# Patient Record
Sex: MALE | Race: OTHER | Employment: FULL TIME | URBAN - METROPOLITAN AREA
[De-identification: names, ages, dates, MRNs, and addresses within clinical notes are randomized per-mention and may not be internally consistent; named-entity substitution may affect disease eponyms.]

---

## 2019-05-20 ENCOUNTER — APPOINTMENT (OUTPATIENT)
Dept: GENERAL RADIOLOGY | Age: 29
End: 2019-05-20
Attending: NURSE PRACTITIONER
Payer: COMMERCIAL

## 2019-05-20 ENCOUNTER — HOSPITAL ENCOUNTER (EMERGENCY)
Age: 29
Discharge: HOME | End: 2019-05-20
Attending: EMERGENCY MEDICINE
Payer: COMMERCIAL

## 2019-05-20 VITALS
OXYGEN SATURATION: 97 % | SYSTOLIC BLOOD PRESSURE: 172 MMHG | HEART RATE: 76 BPM | TEMPERATURE: 98.6 F | RESPIRATION RATE: 18 BRPM | DIASTOLIC BLOOD PRESSURE: 96 MMHG

## 2019-05-20 DIAGNOSIS — M62.838 MUSCLE SPASM: ICD-10-CM
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
## 2019-05-20 PROCEDURE — 99283 EMERGENCY DEPT VISIT LOW MDM: CPT

## 2019-05-20 PROCEDURE — 72100 X-RAY EXAM L-S SPINE 2/3 VWS: CPT

## 2019-05-20 RX ORDER — CYCLOBENZAPRINE HCL 10 MG
10 TABLET ORAL
Qty: 15 TAB | Refills: 0 | Status: SHIPPED | OUTPATIENT
Start: 2019-05-20

## 2019-05-20 NOTE — LETTER
[address] 
[facility] [id number] 
[phone] Work/School Note Date: 5/20/2019 To Whom It May concern: 
 
Jayjay Russo was seen and treated today in the emergency room by the following provider(s): 
Attending Provider: Margaret Fortune DO 
Nurse Practitioner: Linda Mascorro NP. Jayjay Russo may return to work on 5/22/2019.  
 
Sincerely, 
 
 
 
 
Emanuel Galarza NP 
 
 
 

No
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
## 2020-07-27 LAB
COMMENT, HOLDF: NORMAL
SAMPLES BEING HELD,HOLD: NORMAL

## 2020-07-27 PROCEDURE — 85025 COMPLETE CBC W/AUTO DIFF WBC: CPT

## 2020-07-27 PROCEDURE — 96374 THER/PROPH/DIAG INJ IV PUSH: CPT

## 2020-07-27 PROCEDURE — 99283 EMERGENCY DEPT VISIT LOW MDM: CPT

## 2020-07-27 PROCEDURE — 36415 COLL VENOUS BLD VENIPUNCTURE: CPT

## 2020-07-27 PROCEDURE — 80053 COMPREHEN METABOLIC PANEL: CPT

## 2020-07-28 ENCOUNTER — HOSPITAL ENCOUNTER (EMERGENCY)
Age: 30
Discharge: HOME | End: 2020-07-28
Attending: EMERGENCY MEDICINE | Admitting: EMERGENCY MEDICINE

## 2020-07-28 ENCOUNTER — HOSPITAL ENCOUNTER (OUTPATIENT)
Dept: CT IMAGING | Age: 30
Discharge: HOME | End: 2020-07-28
Attending: EMERGENCY MEDICINE
Payer: SELF-PAY

## 2020-07-28 VITALS
TEMPERATURE: 98 F | HEART RATE: 90 BPM | BODY MASS INDEX: 41.79 KG/M2 | DIASTOLIC BLOOD PRESSURE: 70 MMHG | SYSTOLIC BLOOD PRESSURE: 140 MMHG | HEIGHT: 62 IN | RESPIRATION RATE: 16 BRPM | WEIGHT: 227.07 LBS | OXYGEN SATURATION: 96 %

## 2020-07-28 DIAGNOSIS — N20.0 KIDNEY STONE: Primary | ICD-10-CM

## 2020-07-28 LAB
ALBUMIN SERPL-MCNC: 4.2 G/DL (ref 3.5–5)
ALBUMIN/GLOB SERPL: 0.9 {RATIO} (ref 1.1–2.2)
ALP SERPL-CCNC: 74 U/L (ref 45–117)
ALT SERPL-CCNC: 288 U/L (ref 12–78)
ANION GAP SERPL CALC-SCNC: 8 MMOL/L (ref 5–15)
APPEARANCE UR: CLEAR
AST SERPL-CCNC: 142 U/L (ref 15–37)
BACTERIA URNS QL MICRO: NEGATIVE /HPF
BASOPHILS # BLD: 0.1 K/UL (ref 0–0.1)
BASOPHILS NFR BLD: 1 % (ref 0–1)
BILIRUB SERPL-MCNC: 0.9 MG/DL (ref 0.2–1)
BILIRUB UR QL: NEGATIVE
BUN SERPL-MCNC: 15 MG/DL (ref 6–20)
BUN/CREAT SERPL: 12 (ref 12–20)
CALCIUM SERPL-MCNC: 9.2 MG/DL (ref 8.5–10.1)
CHLORIDE SERPL-SCNC: 104 MMOL/L (ref 97–108)
CO2 SERPL-SCNC: 24 MMOL/L (ref 21–32)
COLOR UR: ABNORMAL
CREAT SERPL-MCNC: 1.29 MG/DL (ref 0.7–1.3)
DIFFERENTIAL METHOD BLD: ABNORMAL
EOSINOPHIL # BLD: 0.1 K/UL (ref 0–0.4)
EOSINOPHIL NFR BLD: 1 % (ref 0–7)
EPITH CASTS URNS QL MICRO: ABNORMAL /LPF
ERYTHROCYTE [DISTWIDTH] IN BLOOD BY AUTOMATED COUNT: 13.1 % (ref 11.5–14.5)
GLOBULIN SER CALC-MCNC: 4.6 G/DL (ref 2–4)
GLUCOSE SERPL-MCNC: 149 MG/DL (ref 65–100)
GLUCOSE UR STRIP.AUTO-MCNC: NEGATIVE MG/DL
HCT VFR BLD AUTO: 46.9 % (ref 36.6–50.3)
HGB BLD-MCNC: 16.2 G/DL (ref 12.1–17)
HGB UR QL STRIP: ABNORMAL
HYALINE CASTS URNS QL MICRO: ABNORMAL /LPF (ref 0–5)
IMM GRANULOCYTES # BLD AUTO: 0.1 K/UL (ref 0–0.04)
IMM GRANULOCYTES NFR BLD AUTO: 1 % (ref 0–0.5)
KETONES UR QL STRIP.AUTO: NEGATIVE MG/DL
LEUKOCYTE ESTERASE UR QL STRIP.AUTO: NEGATIVE
LYMPHOCYTES # BLD: 1.9 K/UL (ref 0.8–3.5)
LYMPHOCYTES NFR BLD: 17 % (ref 12–49)
MCH RBC QN AUTO: 29.1 PG (ref 26–34)
MCHC RBC AUTO-ENTMCNC: 34.5 G/DL (ref 30–36.5)
MCV RBC AUTO: 84.4 FL (ref 80–99)
MONOCYTES # BLD: 0.4 K/UL (ref 0–1)
MONOCYTES NFR BLD: 4 % (ref 5–13)
NEUTS SEG # BLD: 8.5 K/UL (ref 1.8–8)
NEUTS SEG NFR BLD: 76 % (ref 32–75)
NITRITE UR QL STRIP.AUTO: NEGATIVE
NRBC # BLD: 0 K/UL (ref 0–0.01)
NRBC BLD-RTO: 0 PER 100 WBC
PH UR STRIP: 5 [PH] (ref 5–8)
PLATELET # BLD AUTO: 338 K/UL (ref 150–400)
PMV BLD AUTO: 10 FL (ref 8.9–12.9)
POTASSIUM SERPL-SCNC: 4 MMOL/L (ref 3.5–5.1)
PROT SERPL-MCNC: 8.8 G/DL (ref 6.4–8.2)
PROT UR STRIP-MCNC: NEGATIVE MG/DL
RBC # BLD AUTO: 5.56 M/UL (ref 4.1–5.7)
RBC #/AREA URNS HPF: ABNORMAL /HPF (ref 0–5)
SODIUM SERPL-SCNC: 136 MMOL/L (ref 136–145)
SP GR UR REFRACTOMETRY: <1.005
UR CULT HOLD, URHOLD: NORMAL
UROBILINOGEN UR QL STRIP.AUTO: 0.2 EU/DL (ref 0.2–1)
WBC # BLD AUTO: 11.2 K/UL (ref 4.1–11.1)
WBC URNS QL MICRO: ABNORMAL /HPF (ref 0–4)

## 2020-07-28 PROCEDURE — 74011636320 HC RX REV CODE- 636/320: Performed by: RADIOLOGY

## 2020-07-28 PROCEDURE — 74011000636 HC RX REV CODE- 636

## 2020-07-28 PROCEDURE — 81001 URINALYSIS AUTO W/SCOPE: CPT

## 2020-07-28 PROCEDURE — 74011250636 HC RX REV CODE- 250/636: Performed by: EMERGENCY MEDICINE

## 2020-07-28 PROCEDURE — 74177 CT ABD & PELVIS W/CONTRAST: CPT

## 2020-07-28 PROCEDURE — 74011000258 HC RX REV CODE- 258: Performed by: RADIOLOGY

## 2020-07-28 RX ORDER — SODIUM CHLORIDE 0.9 % (FLUSH) 0.9 %
10 SYRINGE (ML) INJECTION
Status: COMPLETED | OUTPATIENT
Start: 2020-07-28 | End: 2020-07-28

## 2020-07-28 RX ORDER — KETOROLAC TROMETHAMINE 30 MG/ML
30 INJECTION, SOLUTION INTRAMUSCULAR; INTRAVENOUS
Status: COMPLETED | OUTPATIENT
Start: 2020-07-28 | End: 2020-07-28

## 2020-07-28 RX ORDER — KETOROLAC TROMETHAMINE 10 MG/1
10 TABLET, FILM COATED ORAL
Qty: 15 TAB | Refills: 0 | Status: SHIPPED | OUTPATIENT
Start: 2020-07-28 | End: 2020-08-02

## 2020-07-28 RX ORDER — HYDROCODONE BITARTRATE AND ACETAMINOPHEN 5; 325 MG/1; MG/1
1 TABLET ORAL
Qty: 12 TAB | Refills: 0 | Status: SHIPPED | OUTPATIENT
Start: 2020-07-28 | End: 2020-07-31

## 2020-07-28 RX ORDER — ONDANSETRON 8 MG/1
8 TABLET, ORALLY DISINTEGRATING ORAL
Qty: 10 TAB | Refills: 0 | Status: SHIPPED | OUTPATIENT
Start: 2020-07-28

## 2020-07-28 RX ADMIN — IOPAMIDOL 100 ML: 755 INJECTION, SOLUTION INTRAVENOUS at 00:49

## 2020-07-28 RX ADMIN — KETOROLAC TROMETHAMINE 30 MG: 30 INJECTION, SOLUTION INTRAMUSCULAR at 02:08

## 2020-07-28 RX ADMIN — Medication 10 ML: at 00:49

## 2020-07-28 RX ADMIN — SODIUM CHLORIDE 100 ML: 900 INJECTION, SOLUTION INTRAVENOUS at 00:49

## 2020-07-28 NOTE — ED TRIAGE NOTES
Patient arrives ambulatory from home with CC of right sided lower abdominal pain and vomiting that started yesterday and worsened tonight.  +chills

## 2020-07-28 NOTE — DISCHARGE INSTRUCTIONS
Patient Education        You have a 2 mm kidney stone in the distal right ureter. This should pass on its own. I have prescribed Toradol for pain, Zofran for any nausea vomiting. If the Toradol is not controlling your pain you can try the Norco.  This is a narcotic prescription however so you need to use cautiously as it can be addictive. You cannot drive while taking this medication. Please call the urologist in the morning. Let them know you are in the ER and diagnosed with a kidney stone. They will arrange outpatient follow-up. If you develop a fever or uncontrolled pain or vomiting, return to the emergency department  Cálculo renal: Instrucciones de cuidado  Kidney Stone: Care Instructions  Instrucciones de cuidado    Los cálculos renales se hernando cuando se aglutinan sales, minerales y otras sustancias que normalmente se encuentran en la orina. Pueden ser hayes pequeños agapito granos de arena o, raras veces, tan grandes agapito pelotas de golf. Mientras el cálculo se desplaza por el uréter, que es el tubo que transporta la [place] del riñón a la vejiga, probablemente sienta dolor. El dolor puede ser leve o muy adelina. También puede tabitha algo de jhon en la orina. En cuanto el cálculo llega a la vejiga, todo dolor intenso debería desaparecer. Si un cálculo es demasiado cornell para ser expulsado por ariza propia cuenta, quizás requiera un procedimiento médico para ayudarle a eliminar el cálculo. El médico lo moscoso revisado detenidamente, gulshan pueden desarrollarse problemas más tarde. Si nota algún problema o nuevos síntomas, busque tratamiento médico de inmediato. La atención de seguimiento es rosangela parte clave de ariza tratamiento y seguridad. Asegúrese de hacer y acudir a todas las citas, y llame a ariza médico si está teniendo problemas. También es rosangela buena idea saber los resultados de janet exámenes y mantener rosangela lista de los medicamentos que uirah. ¿Cómo puede cuidarse en el hogar?   · Martha líquidos en abundancia, lo suficiente para que ariza orina sea de color amarillo alex o transparente agapito el agua. Si tiene enfermedad renal, cardíaca o hepática y tiene que restringir los líquidos, hable con ariza médico antes de aumentar la cantidad de líquidos que nikolas. · [employer] analgésicos (medicamentos para el dolor) exactamente según lo indicado. Llame a ariza médico si jr que está teniendo un problema con ariza medicamento. ? Si el médico le recetó un analgésico, tómelo según lo prescrito. ? Si no está tomando un analgésico recetado, pregúntele a ariza médico si puede nikky tamy de [employer]. Kimber y siga todas las instrucciones de la [place]. · Quizás ariza médico le pida que cuele la orina para que pueda recoger el cálculo renal cuando lo elimine. Puede usar un colador de cocina o de té para atrapar el cálculo. Guárdelo en rosangela bolsa de plástico hasta que sreekanth a ariza médico de nuevo. Cómo prevenir cálculos renales en el futuro  Algunos cambios en ariza dieta pueden ayudar a prevenir los cálculos renales. Dependiendo de la causa de los cálculos, puede que ariza médico le recomiende que:  · Martha líquidos en abundancia, lo suficiente para que ariza orina sea de color amarillo alex o transparente agapito el agua. Si tiene enfermedad renal, cardíaca o hepática y tiene que restringir los líquidos, hable con ariza médico antes de aumentar la cantidad de líquidos que nikolas. · Restrinja el café, el té y el alcohol. Además, evite el jugo de toronja. · No tome más de la dosis diaria recomendada de vitaminas C y D.  · Evite los antiácidos agapito Gaviscon, Maalox, Mylanta o Tums. · Restrinja la cantidad de sal (sodio) en ariza dieta. · Consuma rosangela dieta equilibrada que no sea demasiado khadijah en proteína. · Restrinja alimentos que young ricos en rosangela sustancia llamada oxalato, que puede causar cálculos renales. Estos alimentos [employer] verduras de color radha oscuro, el ruibarbo, el chocolate, el salvado de merary, las nueces, los arándanos y los frijoles.   ¿Cuándo debe pedir ayuda? Llame a ariza médico ahora mismo o busque atención médica inmediata si:  · No puede retener líquidos. · Ariza dolor empeora. · Tiene fiebre o escalofríos. · Tiene dolor de espalda nuevo o peor, jackie debajo de la caja torácica (el [facility]). · Tiene nueva o más jhon en la [facility]. Vigile de cerca los cambios en ariza surinder y asegúrese de comunicarse con ariza médico si:  · No mejora agapito se esperaba. ¿Dónde puede encontrar más información en inglés? Oli Maciel a http://ramsey-carissa.info/  Nghiao Nilda [id number] en la búsqueda para aprender más acerca de \"Cálculo renal: Instrucciones de cuidado. \"  Revisado: 12 agosto, 2284               IMDFBIS del contenido: 12.5  © 5359-8571 Healthwise, Incorporated. Las instrucciones de cuidado fueron adaptadas bajo licencia por [employer] (which disclaims liability or warranty for this information). Si usted tiene [place] afección médica o sobre estas instrucciones, siempre pregunte a ariza profesional de surinder. Healthwise, Incorporated niega toda garantía o responsabilidad por ariza uso de esta información.

## 2020-07-28 NOTE — ED PROVIDER NOTES
HPI     27-year-old male without significant past medical history presents the emergency department complaining of right-sided abdominal pain radiating to his groin. Patient states symptoms started yesterday and they have been intermittent. When he arrived tonight to the ER it was an 8 out of 10. It is currently 0 out of 10. He is not having any difficulty urinating. He denies any fever. He states when the pain comes on it does cause him to have some nausea but no vomiting. He has no history of kidney stones. He did notice some blood in his urine yesterday. History reviewed. No pertinent past medical history. History reviewed. No pertinent surgical history. History reviewed. No pertinent family history.     Social History     Socioeconomic History    Marital status:      Spouse name: Not on file    Number of children: Not on file    Years of education: Not on file    Highest education level: Not on file   Occupational History    Not on file   Social Needs    Financial resource strain: Not on file    Food insecurity     Worry: Not on file     Inability: Not on file    Transportation needs     Medical: Not on file     Non-medical: Not on file   Tobacco Use    Smoking status: Never Smoker    Smokeless tobacco: Never Used   Substance and Sexual Activity    Alcohol use: Yes     Comment: occasionally    Drug use: Not Currently    Sexual activity: Not on file   Lifestyle    Physical activity     Days per week: Not on file     Minutes per session: Not on file    Stress: Not on file   Relationships    Social connections     Talks on phone: Not on file     Gets together: Not on file     Attends Church service: Not on file     Active member of club or organization: Not on file     Attends meetings of clubs or organizations: Not on file     Relationship status: Not on file    Intimate partner violence     Fear of current or ex partner: Not on file     Emotionally abused: Not on file Physically abused: Not on file     Forced sexual activity: Not on file   Other Topics Concern    Not on file   Social History Narrative    Not on file         ALLERGIES: Patient has no known allergies. Review of Systems   Constitutional: Negative for fever. HENT: Negative for congestion. Eyes: Negative for visual disturbance. Respiratory: Negative for cough and shortness of breath. Cardiovascular: Negative for chest pain. Gastrointestinal: Positive for abdominal pain, nausea and vomiting. Genitourinary: Positive for hematuria. Negative for dysuria and flank pain. Musculoskeletal: Negative for gait problem. Skin: Negative for rash. Psychiatric/Behavioral: Negative for dysphoric mood. Vitals:    07/27/20 2340   BP: 147/76   Pulse: 90   Resp: 16   Temp: 98.8 °F (37.1 °C)   SpO2: 96%   Weight: 103 kg (227 lb 1.2 oz)   Height: 5' 2\" (1.575 m)            Physical Exam  Constitutional:       General: He is not in acute distress. Appearance: He is well-developed. HENT:      Head: Normocephalic and atraumatic. Mouth/Throat:      Pharynx: No oropharyngeal exudate. Eyes:      General: No scleral icterus. Right eye: No discharge. Left eye: No discharge. Pupils: Pupils are equal, round, and reactive to light. Neck:      Musculoskeletal: Normal range of motion and neck supple. Vascular: No JVD. Cardiovascular:      Rate and Rhythm: Normal rate and regular rhythm. Heart sounds: Normal heart sounds. No murmur. Pulmonary:      Effort: Pulmonary effort is normal. No respiratory distress. Breath sounds: Normal breath sounds. No stridor. No wheezing or rales. Chest:      Chest wall: No tenderness. Abdominal:      General: Bowel sounds are normal. There is no distension. Palpations: Abdomen is soft. There is no mass. Tenderness: There is no abdominal tenderness. There is no guarding or rebound.    Musculoskeletal: Normal range of motion. Skin:     General: Skin is warm and dry. Capillary Refill: Capillary refill takes less than 2 seconds. Findings: No rash. Neurological:      Mental Status: He is oriented to person, place, and time. Psychiatric:         Behavior: Behavior normal.         Thought Content: Thought content normal.         Judgment: Judgment normal.          [facility]       Procedures    1 to 2 mm right distal ureteral stone. Reviewed results with patient and his significant other who is translating. I have given them a prescription for Toradol and Zofran. Dioni Hunt was called into the [facility]. He was instructed to use cautiously. They are to follow-up with urology. Return precautions were provided.